# Patient Record
Sex: FEMALE | Race: WHITE | NOT HISPANIC OR LATINO | Employment: FULL TIME | URBAN - METROPOLITAN AREA
[De-identification: names, ages, dates, MRNs, and addresses within clinical notes are randomized per-mention and may not be internally consistent; named-entity substitution may affect disease eponyms.]

---

## 2024-03-21 ENCOUNTER — TELEPHONE (OUTPATIENT)
Dept: TRANSPLANT | Facility: CLINIC | Age: 64
End: 2024-03-21
Payer: COMMERCIAL

## 2024-03-21 DIAGNOSIS — C83.18 MANTLE CELL LYMPHOMA OF LYMPH NODES OF MULTIPLE SITES (H): Primary | ICD-10-CM

## 2024-03-25 ENCOUNTER — MEDICAL CORRESPONDENCE (OUTPATIENT)
Dept: TRANSPLANT | Facility: CLINIC | Age: 64
End: 2024-03-25
Payer: COMMERCIAL

## 2024-03-25 ENCOUNTER — TELEPHONE (OUTPATIENT)
Dept: TRANSPLANT | Facility: CLINIC | Age: 64
End: 2024-03-25
Payer: COMMERCIAL

## 2024-03-29 ENCOUNTER — CARE COORDINATION (OUTPATIENT)
Dept: TRANSPLANT | Facility: CLINIC | Age: 64
End: 2024-03-29
Payer: COMMERCIAL

## 2024-03-29 NOTE — PROGRESS NOTES
[facility] BMT and Cell Therapy Program  RN Coordinator Pre-Visit Documentation      Rebeka Mortensen is a 63 year old female who has been referred to the [facility] BMT and Cell Therapy Program for hematopoietic cell transplant or immune effector cell therapy.      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           Jo Moffett RN

## 2024-04-12 ENCOUNTER — OFFICE VISIT (OUTPATIENT)
Dept: TRANSPLANT | Facility: CLINIC | Age: 64
End: 2024-04-12
Attending: INTERNAL MEDICINE
Payer: COMMERCIAL

## 2024-04-12 ENCOUNTER — TRANSFERRED RECORDS (OUTPATIENT)
Dept: HEALTH INFORMATION MANAGEMENT | Facility: CLINIC | Age: 64
End: 2024-04-12
Payer: COMMERCIAL

## 2024-04-12 VITALS
RESPIRATION RATE: 12 BRPM | BODY MASS INDEX: 28.79 KG/M2 | WEIGHT: 172.8 LBS | DIASTOLIC BLOOD PRESSURE: 82 MMHG | TEMPERATURE: 98.5 F | OXYGEN SATURATION: 98 % | HEIGHT: 65 IN | SYSTOLIC BLOOD PRESSURE: 123 MMHG | HEART RATE: 111 BPM

## 2024-04-12 DIAGNOSIS — C83.19 MANTLE CELL LYMPHOMA OF EXTRANODAL SITE EXCLUDING SPLEEN AND OTHER SOLID ORGANS (H): Primary | ICD-10-CM

## 2024-04-12 DIAGNOSIS — M05.9 RHEUMATOID ARTHRITIS, SEROPOSITIVE (H): ICD-10-CM

## 2024-04-12 PROCEDURE — 99211 OFF/OP EST MAY X REQ PHY/QHP: CPT

## 2024-04-12 PROCEDURE — 99205 OFFICE O/P NEW HI 60 MIN: CPT

## 2024-04-12 RX ORDER — INSULIN ASPART 100 [IU]/ML
0-5 INJECTION, SOLUTION INTRAVENOUS; SUBCUTANEOUS
COMMUNITY

## 2024-04-12 RX ORDER — VITAMIN B COMPLEX
TABLET ORAL
COMMUNITY
Start: 2024-03-26

## 2024-04-12 RX ORDER — INSULIN GLARGINE 100 [IU]/ML
14 INJECTION, SOLUTION SUBCUTANEOUS
COMMUNITY

## 2024-04-12 RX ORDER — ACYCLOVIR 400 MG/1
400 TABLET ORAL
COMMUNITY
Start: 2023-12-17

## 2024-04-12 RX ORDER — FLUTICASONE PROPIONATE 50 MCG
1 SPRAY, SUSPENSION (ML) NASAL
COMMUNITY

## 2024-04-12 RX ORDER — FOLIC ACID 1 MG/1
2 TABLET ORAL
COMMUNITY
Start: 2024-04-02

## 2024-04-12 RX ORDER — SIMVASTATIN 40 MG
1 TABLET ORAL AT BEDTIME
COMMUNITY
Start: 2022-09-30

## 2024-04-12 RX ORDER — PROCHLORPERAZINE MALEATE 10 MG
10 TABLET ORAL
COMMUNITY
Start: 2023-12-13

## 2024-04-12 RX ORDER — ONDANSETRON 8 MG/1
8 TABLET, FILM COATED ORAL
COMMUNITY
Start: 2023-12-17

## 2024-04-12 RX ORDER — EPINEPHRINE 0.3 MG/.3ML
1 INJECTION SUBCUTANEOUS
COMMUNITY
Start: 2024-01-11

## 2024-04-12 RX ORDER — METFORMIN HCL 500 MG
1000 TABLET, EXTENDED RELEASE 24 HR ORAL
COMMUNITY
Start: 2023-07-03

## 2024-04-12 RX ORDER — SULFAMETHOXAZOLE/TRIMETHOPRIM 800-160 MG
1 TABLET ORAL
COMMUNITY
Start: 2023-12-17

## 2024-04-12 RX ORDER — ACETAMINOPHEN 325 MG/1
325 TABLET ORAL
COMMUNITY

## 2024-04-12 RX ORDER — LOSARTAN POTASSIUM 50 MG/1
50 TABLET ORAL AT BEDTIME
COMMUNITY

## 2024-04-12 RX ORDER — METHOTREXATE 2.5 MG/1
15 TABLET ORAL
COMMUNITY
Start: 2024-04-02

## 2024-04-12 RX ORDER — ASCORBIC ACID 500 MG
500 TABLET ORAL AT BEDTIME
COMMUNITY

## 2024-04-12 RX ORDER — OMEPRAZOLE 40 MG/1
40 CAPSULE, DELAYED RELEASE ORAL
COMMUNITY
Start: 2023-12-13

## 2024-04-12 ASSESSMENT — PAIN SCALES - GENERAL: PAINLEVEL: NO PAIN (0)

## 2024-04-12 NOTE — PROGRESS NOTES
[facility]         BMT/CT NEW PATIENT REFERRAL        Apr 12, 2024   Subjective   REFERRAL SOURCE: Rajan Cleveland MD    REASON FOR VISIT: Auto consult for mantle cell lymphoma    HISTORY OF PRESENT ILLNESS:  Ms. Rebeka Mortensen is a 63 year old female who presents for autologous transplant consultation for mantle cell lymphoma in first remission.     Disease presentation and baseline characteristics: Stage DEAN mantle cell lymphoma dx 11/2023, presenting with cervical and inguinal LAD for 5-6 months. Left inguinal core bx showed mantle cell lymphoma, Ki67 40%, SOX11 positive; insufficient tissue for TP53 testing. PET with extensive LAD above/below diaphragm (largest 4.6 cm left inguinal) and diffuse FDG avidity in marrow. Staging BmBx normocellular with 5-10% lymphoma involvement, cytogenetics normal, TP53 NGS negative. , MIPI high-risk.     Date Treatment Name Response Side Effects / Toxicities   12/2023 to 4/2024 Alternating R-CHOP + ibrutinib / R-DHAP x 6 cycles  CR after 4 cycles, EOT scan pending Rituximab infusion reaction, nausea, peripheral neuropathy              Rebeka is here today with her brother, as well as son by speakerphone. Last cycle/Cycle 6 was 3/22/24. She states that the last few rounds of chemo were tougher with more fatigue, headaches, nausea, SOB, numbness/tingling in feet. Occasional loose stools as well. Appetite is poor due to metallic taste in her mouth. Her RA symptoms (predominantly in hands) have also worsened when methotrexate was held during chemotherapy - she just restarted it last week now that she is done with chemo. Otherwise no fevers, recent infections, chest pain, abdominal pain, or bleeding. Still able to go on 30-minute walks.     PAST MEDICAL HISTORY:  Mantle cell lymphoma as above  Rheumatoid arthritis, previously on methotrexate  MRSA pneumonia 12/2023  Hypertension  Hyperlipidemia  T2DM  Sciatica  Vitamin D  deficiency    FAMILY HISTORY:  Father had throat cancer, was a heavy smoker.  No hematologic malignancies that she is aware of.    SOCIAL HISTORY:  Remote smoking history, quit over 30 years ago. Denies alcohol use currently.  Lives in [place], [place]. Has 2 sons who also live in [place].  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      Allergies   Allergen Reactions    Allopurinol Other (See Comments)     Tingling in lips, chin, neck    Pollen Extract      Other Reaction(s): Runny Nose       Current Outpatient Medications:     acyclovir (ZOVIRAX) 400 MG tablet, Take 400 mg by mouth, Disp: , Rfl:     folic acid (FOLVITE) 1 MG tablet, Take 2 mg by mouth, Disp: , Rfl:     metFORMIN (GLUCOPHAGE XR) 500 MG 24 hr tablet, Take 1,000 mg by mouth, Disp: , Rfl:     methotrexate 2.5 MG tablet, Take 15 mg by mouth, Disp: , Rfl:     omeprazole (PRILOSEC) 40 MG DR capsule, Take 40 mg by mouth, Disp: , Rfl:     ondansetron (ZOFRAN) 8 MG tablet, Take 8 mg by mouth, Disp: , Rfl:     prochlorperazine (COMPAZINE) 10 MG tablet, Take 10 mg by mouth, Disp: , Rfl:     simvastatin (ZOCOR) 40 MG tablet, Take 1 tablet by mouth at bedtime, Disp: , Rfl:     sulfamethoxazole-trimethoprim (BACTRIM DS) 800-160 MG tablet, Take 1 tablet by mouth, Disp: , Rfl:     Vitamin D3 (VITAMIN D-1000 MAX ST) 25 mcg (1000 units) tablet, TAKE 1 TABLET (1,000 UNITS) BY MOUTH ONCE DAILY. PROTECT FROM LIGHT., Disp: , Rfl:     acetaminophen (TYLENOL) 325 MG tablet, Take 325 mg by mouth, Disp: , Rfl:     diclofenac (VOLTAREN) 1 % topical gel, Apply 2 g topically, Disp: , Rfl:     EPINEPHrine (ANY BX GENERIC EQUIV) 0.3 MG/0.3ML injection 2-pack, Inject 1 Pen into the muscle, Disp: , Rfl:     fluticasone (FLONASE) 50 MCG/ACT nasal spray, Spray 1 spray in nostril, Disp: , Rfl:     Insulin Aspart FlexPen 100 UNIT/ML SOPN, Inject 0-5 Units Subcutaneous, Disp: , Rfl:     insulin glargine (LANTUS SOLOSTAR) 100 UNIT/ML pen, Inject 14 Units Subcutaneous, Disp: , Rfl:      "losartan (COZAAR) 50 MG tablet, Take 50 mg by mouth at bedtime, Disp: , Rfl:     vitamin C (ASCORBIC ACID) 500 MG tablet, Take 500 mg by mouth at bedtime, Disp: , Rfl:      REVIEW OF SYSTEMS:  12-point ROS reviewed and negative other than that mentioned in HPI.     Objective   VITAL SIGNS:  /82 (BP Location: Right arm, Patient Position: Sitting, Cuff Size: Adult Regular)   Pulse 111   Temp 98.5  F (36.9  C) (Oral)   Resp 12   Ht 1.64 m (5' 4.57\")   Wt 78.4 kg (172 lb 12.8 oz)   SpO2 98%   BMI 29.14 kg/m      ECOG PS: 1     PHYSICAL EXAM:  General: Awake, alert, in no acute distress.   HEENT: Normocephalic, atraumatic. No scleral icterus.   Lymph: No cervical, supraclavicular, or axillary lymphadenopathy appreciated.   CV: Regular rate and rhythm. No murmurs, rubs, or gallops appreciated.  Resp: Good inspiratory effort, lungs clear to auscultation bilaterally.  GI: Abdomen soft, nontender, nondistended. No masses or organomegaly appreciated.   Ext: No peripheral edema bilaterally.  Neuro: CN II-XII grossly intact. No focal deficits appreciated.  Skin: No rash, unusual bruising or prominent lesions.  Psych: Pleasant, normal affect.    LABS:  [facility]  WBC 7.6, Hgb 10.2, plts 185  Cr 0.82, Na 136, K 4.7, Ca 9.6    IMAGIN23 PET-CT:  IMPRESSION:   1. Extensive lymphadenopathy throughout the chest, abdomen and   pelvis demonstrates mild to moderate FDG avidity. Findings consistent   with known lymphoma. The lymph nodes are decreased in size since   2023.   2. Unchanged mild consolidative and reticulonodular opacities in the   periphery of the lungs bilaterally with mild FDG avidity. Findings   consistent with pneumonitis. Appearance is suggestive of interstitial   lung disease and developing fibrosis.   3.  A small focal area of moderately FDG avid consolidation in the   left upper lobe is also unchanged and presumably related to   pneumonitis. Pulmonary " lymphoma is not excluded.   4.  Diffuse FDG avidity of the marrow, which may be due to   chemotherapy effect versus lymphomatous involvement of the marrow.     2/16/24 PET-CT:  IMPRESSION:   1. Interval response to therapy. There remains minimal FDG avidity   within a left axillary lymph node as detailed above which is markedly   decreased in overall size as demonstrated on the CT chest, abdomen   and pelvis of 02/16/2024.   2. Diffuse FDG avidity throughout the osseous structures compatible   with chemotherapeutic effect.   3. Please see dedicated CT report from 02/16/2024 for further details   on anatomic findings and lymph node measurements of the chest,   abdomen and pelvis.   4. Moderate left popliteal cyst.     PATHOLOGY:  11/17/23 Left inguinal LN:  LYMPH NODE, LEFT INGUINAL CHAIN, NEEDLE CORE BIOPSY:   1. Mantle cell lymphoma (WHO 2016)   2. Ki-67 proliferation index = 40%   3. Regarding TP53 mutation analysis by Next Generation Sequencing (NGS), See comment     12/7/23 Bone marrow biopsy:  BONE MARROW AND PERIPHERAL BLOOD:   1.  Mantle cell lymphoma (WHO 2016) characterized by:       a.  Bone marrow involvement:  5-10%       b.  Bone marrow cellularity:  30-40%       c.  Peripheral blood:           - Mild normocytic anemia           - Moderate absolute neutrophilia, favor reactive   2.  Adequate storage iron; sideroblastic iron present   3.  Ancillary studies:       a.  Cytogenetics:           - Chromosome analysis is pending; results will be appended to this report       b.  Molecular:           - TP53 mutation testing is pending, appended results to follow     Assessment & Plan   #Stage IV mantle cell lymphoma  #Rheumatoid arthritis    Pleasant 63 year old female with history of rheumatoid arthritis previously on methotrexate diagnosed with stage IV mantle cell lymphoma 11/2023 involving lymph nodes diffusely and bone marrow. High risk with Ki67 40% and high MIPI score but TP53 negative. She completed  induction therapy following the TRIANGLE regimen with alternating R-CHOP + ibrutinib / R-DHAP x 6 cycles. Interim PET after 4 cycles already showed CR.     We reviewed the role of autologous transplantation as consolidation in patients with mantle cell lymphoma in first remission. We discussed that ASCT for mantle cell lymphoma is not curative and historically only improves progression-free survival but not overall survival. Moreover, the recent  TRIANGLE trial showed that the addition of ibrutinib to induction and for 2 years as maintenance after induction may potentially obviate the need for ASCT, as the arms that received ASCT plus ibrutinib maintenance vs ibrutinib maintenance alone has similar 3-year failure-free survival (88% vs 86%) and 3-year overall survival (91% vs 92%) - https://doi.org/10.1182/blood-2022-163018. Thus, for this patient especially with some higher risk features and cumulative chemotherapy toxicities, I would recommend deferring ASCT for now and to proceed with ibrutinib maintenance x 2 years as outlined by the TRIANGLE regimen. Notably, rituximab maintenance is also optional; however she declines this as she states that she had infusion reactions to rituximab and it also did not control her RA symptoms.     We discussed that at time of relapse, she could then be considered for salvage therapy plus ASCT in second remission vs CD19 CAR-T therapy depending on her other comorbidities and performance status at that time. We briefly reviewed the logistics and potential side effects of each. I communicated my recommendations to Dr. Cleveland.     PLAN:  - Defer ASCT for now, agree with ibrutinib maintenance    Total of 60 minutes on patient visit, reviewing records, interpreting test results, placing orders, and documentation on the day of service.    Litzy Dewey MD  Attending Physician, [facility]

## 2024-04-12 NOTE — LETTER
4/12/2024         RE: Rebeka Mortensen  [address]  [facility] [address]        Dear Colleague,    Thank you for referring your patient, Rebeka Mortensen, to the [facility] BLOOD AND MARROW TRANSPLANT PROGRAM [place]. Please see a copy of my visit note below.                                     [facility]         BMT/CT NEW PATIENT REFERRAL        Apr 12, 2024   Subjective  REFERRAL SOURCE: Rajan Cleveland MD    REASON FOR VISIT: Auto consult for mantle cell lymphoma    HISTORY OF PRESENT ILLNESS:  Ms. Rebeka Mortensen is a 63 year old female who presents for autologous transplant consultation for mantle cell lymphoma in first remission.     Disease presentation and baseline characteristics: Stage DEAN mantle cell lymphoma dx 11/2023, presenting with cervical and inguinal LAD for 5-6 months. Left inguinal core bx showed mantle cell lymphoma, Ki67 40%, SOX11 positive; insufficient tissue for TP53 testing. PET with extensive LAD above/below diaphragm (largest 4.6 cm left inguinal) and diffuse FDG avidity in marrow. Staging BmBx normocellular with 5-10% lymphoma involvement, cytogenetics normal, TP53 NGS negative. , MIPI high-risk.     Date Treatment Name Response Side Effects / Toxicities   12/2023 to 4/2024 Alternating R-CHOP + ibrutinib / R-DHAP x 6 cycles  CR after 4 cycles, EOT scan pending Rituximab infusion reaction, nausea, peripheral neuropathy              Rebeka is here today with her brother, as well as son by speakerphone. Last cycle/Cycle 6 was 3/22/24. She states that the last few rounds of chemo were tougher with more fatigue, headaches, nausea, SOB, numbness/tingling in feet. Occasional loose stools as well. Appetite is poor due to metallic taste in her mouth. Her RA symptoms (predominantly in hands) have also worsened when methotrexate was held during chemotherapy - she just restarted it last week now that she is done with chemo. Otherwise  no fevers, recent infections, chest pain, abdominal pain, or bleeding. Still able to go on 30-minute walks.     PAST MEDICAL HISTORY:  Mantle cell lymphoma as above  Rheumatoid arthritis, previously on methotrexate  MRSA pneumonia 12/2023  Hypertension  Hyperlipidemia  T2DM  Sciatica  Vitamin D deficiency    FAMILY HISTORY:  Father had throat cancer, was a heavy smoker.  No hematologic malignancies that she is aware of.    SOCIAL HISTORY:  Remote smoking history, quit over 30 years ago. Denies alcohol use currently.  Lives in [place], [place]. Has 2 sons who also live in [place].  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      Allergies   Allergen Reactions    Allopurinol Other (See Comments)     Tingling in lips, chin, neck    Pollen Extract      Other Reaction(s): Runny Nose       Current Outpatient Medications:     acyclovir (ZOVIRAX) 400 MG tablet, Take 400 mg by mouth, Disp: , Rfl:     folic acid (FOLVITE) 1 MG tablet, Take 2 mg by mouth, Disp: , Rfl:     metFORMIN (GLUCOPHAGE XR) 500 MG 24 hr tablet, Take 1,000 mg by mouth, Disp: , Rfl:     methotrexate 2.5 MG tablet, Take 15 mg by mouth, Disp: , Rfl:     omeprazole (PRILOSEC) 40 MG DR capsule, Take 40 mg by mouth, Disp: , Rfl:     ondansetron (ZOFRAN) 8 MG tablet, Take 8 mg by mouth, Disp: , Rfl:     prochlorperazine (COMPAZINE) 10 MG tablet, Take 10 mg by mouth, Disp: , Rfl:     simvastatin (ZOCOR) 40 MG tablet, Take 1 tablet by mouth at bedtime, Disp: , Rfl:     sulfamethoxazole-trimethoprim (BACTRIM DS) 800-160 MG tablet, Take 1 tablet by mouth, Disp: , Rfl:     Vitamin D3 (VITAMIN D-1000 MAX ST) 25 mcg (1000 units) tablet, TAKE 1 TABLET (1,000 UNITS) BY MOUTH ONCE DAILY. PROTECT FROM LIGHT., Disp: , Rfl:     acetaminophen (TYLENOL) 325 MG tablet, Take 325 mg by mouth, Disp: , Rfl:     diclofenac (VOLTAREN) 1 % topical gel, Apply 2 g topically, Disp: , Rfl:     EPINEPHrine (ANY BX GENERIC EQUIV) 0.3 MG/0.3ML injection 2-pack, Inject 1 Pen into the muscle,  "Disp: , Rfl:     fluticasone (FLONASE) 50 MCG/ACT nasal spray, Spray 1 spray in nostril, Disp: , Rfl:     Insulin Aspart FlexPen 100 UNIT/ML SOPN, Inject 0-5 Units Subcutaneous, Disp: , Rfl:     insulin glargine (LANTUS SOLOSTAR) 100 UNIT/ML pen, Inject 14 Units Subcutaneous, Disp: , Rfl:     losartan (COZAAR) 50 MG tablet, Take 50 mg by mouth at bedtime, Disp: , Rfl:     vitamin C (ASCORBIC ACID) 500 MG tablet, Take 500 mg by mouth at bedtime, Disp: , Rfl:      REVIEW OF SYSTEMS:  12-point ROS reviewed and negative other than that mentioned in HPI.     Objective  VITAL SIGNS:  /82 (BP Location: Right arm, Patient Position: Sitting, Cuff Size: Adult Regular)   Pulse 111   Temp 98.5  F (36.9  C) (Oral)   Resp 12   Ht 1.64 m (5' 4.57\")   Wt 78.4 kg (172 lb 12.8 oz)   SpO2 98%   BMI 29.14 kg/m      ECOG PS: 1     PHYSICAL EXAM:  General: Awake, alert, in no acute distress.   HEENT: Normocephalic, atraumatic. No scleral icterus.   Lymph: No cervical, supraclavicular, or axillary lymphadenopathy appreciated.   CV: Regular rate and rhythm. No murmurs, rubs, or gallops appreciated.  Resp: Good inspiratory effort, lungs clear to auscultation bilaterally.  GI: Abdomen soft, nontender, nondistended. No masses or organomegaly appreciated.   Ext: No peripheral edema bilaterally.  Neuro: CN II-XII grossly intact. No focal deficits appreciated.  Skin: No rash, unusual bruising or prominent lesions.  Psych: Pleasant, normal affect.    LABS:  [facility]  WBC 7.6, Hgb 10.2, plts 185  Cr 0.82, Na 136, K 4.7, Ca 9.6    IMAGIN23 PET-CT:  IMPRESSION:   1. Extensive lymphadenopathy throughout the chest, abdomen and   pelvis demonstrates mild to moderate FDG avidity. Findings consistent   with known lymphoma. The lymph nodes are decreased in size since   2023.   2. Unchanged mild consolidative and reticulonodular opacities in the   periphery of the lungs bilaterally with mild " FDG avidity. Findings   consistent with pneumonitis. Appearance is suggestive of interstitial   lung disease and developing fibrosis.   3.  A small focal area of moderately FDG avid consolidation in the   left upper lobe is also unchanged and presumably related to   pneumonitis. Pulmonary lymphoma is not excluded.   4.  Diffuse FDG avidity of the marrow, which may be due to   chemotherapy effect versus lymphomatous involvement of the marrow.     2/16/24 PET-CT:  IMPRESSION:   1. Interval response to therapy. There remains minimal FDG avidity   within a left axillary lymph node as detailed above which is markedly   decreased in overall size as demonstrated on the CT chest, abdomen   and pelvis of 02/16/2024.   2. Diffuse FDG avidity throughout the osseous structures compatible   with chemotherapeutic effect.   3. Please see dedicated CT report from 02/16/2024 for further details   on anatomic findings and lymph node measurements of the chest,   abdomen and pelvis.   4. Moderate left popliteal cyst.     PATHOLOGY:  11/17/23 Left inguinal LN:  LYMPH NODE, LEFT INGUINAL CHAIN, NEEDLE CORE BIOPSY:   1. Mantle cell lymphoma (WHO 2016)   2. Ki-67 proliferation index = 40%   3. Regarding TP53 mutation analysis by Next Generation Sequencing (NGS), See comment     12/7/23 Bone marrow biopsy:  BONE MARROW AND PERIPHERAL BLOOD:   1.  Mantle cell lymphoma (WHO 2016) characterized by:       a.  Bone marrow involvement:  5-10%       b.  Bone marrow cellularity:  30-40%       c.  Peripheral blood:           - Mild normocytic anemia           - Moderate absolute neutrophilia, favor reactive   2.  Adequate storage iron; sideroblastic iron present   3.  Ancillary studies:       a.  Cytogenetics:           - Chromosome analysis is pending; results will be appended to this report       b.  Molecular:           - TP53 mutation testing is pending, appended results to follow     Assessment & Plan  #Stage IV mantle cell  lymphoma  #Rheumatoid arthritis    Pleasant 63 year old female with history of rheumatoid arthritis previously on methotrexate diagnosed with stage IV mantle cell lymphoma 11/2023 involving lymph nodes diffusely and bone marrow. High risk with Ki67 40% and high MIPI score but TP53 negative. She completed induction therapy following the TRIANGLE regimen with alternating R-CHOP + ibrutinib / R-DHAP x 6 cycles. Interim PET after 4 cycles already showed CR.     We reviewed the role of autologous transplantation as consolidation in patients with mantle cell lymphoma in first remission. We discussed that ASCT for mantle cell lymphoma is not curative and historically only improves progression-free survival but not overall survival. Moreover, the recent  TRIANGLE trial showed that the addition of ibrutinib to induction and for 2 years as maintenance after induction may potentially obviate the need for ASCT, as the arms that received ASCT plus ibrutinib maintenance vs ibrutinib maintenance alone has similar 3-year failure-free survival (88% vs 86%) and 3-year overall survival (91% vs 92%) - https://[email or web address]/10.1182/blood-2022-163018. Thus, for this patient especially with some higher risk features and cumulative chemotherapy toxicities, I would recommend deferring ASCT for now and to proceed with ibrutinib maintenance x 2 years as outlined by the TRIANGLE regimen. Notably, rituximab maintenance is also optional; however she declines this as she states that she had infusion reactions to rituximab and it also did not control her RA symptoms.     We discussed that at time of relapse, she could then be considered for salvage therapy plus ASCT in second remission vs CD19 CAR-T therapy depending on her other comorbidities and performance status at that time. We briefly reviewed the logistics and potential side effects of each. I communicated my recommendations to Dr. Cleveland.     PLAN:  - Defer ASCT for now, agree with  ibrutinib maintenance    Total of 60 minutes on patient visit, reviewing records, interpreting test results, placing orders, and documentation on the day of service.    Litzy Dewey MD  Attending Physician, [facility]

## 2024-04-15 ENCOUNTER — VIRTUAL VISIT (OUTPATIENT)
Dept: TRANSPLANT | Facility: CLINIC | Age: 64
End: 2024-04-15
Attending: INTERNAL MEDICINE
Payer: COMMERCIAL

## 2024-04-15 DIAGNOSIS — Z71.9 VISIT FOR COUNSELING: Primary | ICD-10-CM

## 2024-04-15 NOTE — LETTER
4/15/2024         RE: Rebeka Mortensen  [address]  [facility] [address]        Dear Colleague,    Thank you for referring your patient, Rebeka Mortensen, to the [facility] BLOOD AND MARROW TRANSPLANT PROGRAM [place]. Please see a copy of my visit note below.    Blood and Marrow Transplant   New Transplant Visit with   Clinical     Per Dr. Dewey, BMT is not recommended at this time. Therefore  NT visit is cancelled. Please re-schedule if BMT is reconsidered in the future.    RAJAN Navarrete, [facility]  Adult Blood & Marrow Transplant   Phone: [phone]  [facility] Searchable at BMT SW 2      Again, thank you for allowing me to participate in the care of your patient.        Sincerely,        SHILOH Pimentel

## 2024-04-15 NOTE — PROGRESS NOTES
Blood and Marrow Transplant   New Transplant Visit with   Clinical     Per Dr. Dewey, BMT is not recommended at this time. Therefore  NT visit is cancelled. Please re-schedule if BMT is reconsidered in the future.    RAJAN Navarrete, [facility]  Adult Blood & Marrow Transplant   Phone: [phone]  [facility] Searchable at BMT  2

## 2024-04-16 PROBLEM — M05.9 RHEUMATOID ARTHRITIS, SEROPOSITIVE (H): Status: ACTIVE | Noted: 2020-03-06

## 2024-04-16 PROBLEM — C83.10 MANTLE CELL LYMPHOMA (H): Status: ACTIVE | Noted: 2023-12-01

## 2024-04-16 PROBLEM — G62.0 DRUG-INDUCED POLYNEUROPATHY (H): Status: ACTIVE | Noted: 2024-03-22

## 2024-04-20 ENCOUNTER — HEALTH MAINTENANCE LETTER (OUTPATIENT)
Age: 64
End: 2024-04-20

## 2024-09-07 ENCOUNTER — HEALTH MAINTENANCE LETTER (OUTPATIENT)
Age: 64
End: 2024-09-07

## 2025-01-05 ENCOUNTER — HEALTH MAINTENANCE LETTER (OUTPATIENT)
Age: 65
End: 2025-01-05

## 2025-04-20 ENCOUNTER — HEALTH MAINTENANCE LETTER (OUTPATIENT)
Age: 65
End: 2025-04-20

## 2025-07-13 ENCOUNTER — HEALTH MAINTENANCE LETTER (OUTPATIENT)
Age: 65
End: 2025-07-13

## 2025-08-03 ENCOUNTER — HEALTH MAINTENANCE LETTER (OUTPATIENT)
Age: 65
End: 2025-08-03